# Patient Record
Sex: FEMALE | Race: WHITE | URBAN - METROPOLITAN AREA
[De-identification: names, ages, dates, MRNs, and addresses within clinical notes are randomized per-mention and may not be internally consistent; named-entity substitution may affect disease eponyms.]

---

## 2022-12-27 ENCOUNTER — OFFICE VISIT (OUTPATIENT)
Dept: URBAN - METROPOLITAN AREA CLINIC 17 | Facility: CLINIC | Age: 57
End: 2022-12-27
Payer: COMMERCIAL

## 2022-12-27 DIAGNOSIS — H46.8 OTHER OPTIC NEURITIS: Primary | ICD-10-CM

## 2022-12-27 DIAGNOSIS — H04.123 DRY EYE SYNDROME OF BILATERAL LACRIMAL GLANDS: ICD-10-CM

## 2022-12-27 DIAGNOSIS — H25.813 COMBINED FORMS OF AGE-RELATED CATARACT, BILATERAL: ICD-10-CM

## 2022-12-27 PROCEDURE — 92004 COMPRE OPH EXAM NEW PT 1/>: CPT | Performed by: OPTOMETRIST

## 2022-12-27 ASSESSMENT — INTRAOCULAR PRESSURE
OD: 21
OS: 15

## 2022-12-27 NOTE — IMPRESSION/PLAN
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 Plan: Discussed diagnosis in detail with patient. Discussed risks and benefits and patient understands. Advised patient of condition. Recommend pt to go to PCP or Neurologist and get a MRI and get tested for MS.

## 2023-01-20 ENCOUNTER — OFFICE VISIT (OUTPATIENT)
Dept: URBAN - METROPOLITAN AREA CLINIC 30 | Facility: CLINIC | Age: 58
End: 2023-01-20
Payer: COMMERCIAL

## 2023-01-20 DIAGNOSIS — H04.123 DRY EYE SYNDROME OF BILATERAL LACRIMAL GLANDS: ICD-10-CM

## 2023-01-20 DIAGNOSIS — H43.393 OTHER VITREOUS OPACITIES, BILATERAL: ICD-10-CM

## 2023-01-20 DIAGNOSIS — H47.092 OTHER DISORDERS OF OPTIC NERVE, NOT ELSEWHERE CLASSIFIED, LEFT EYE: ICD-10-CM

## 2023-01-20 DIAGNOSIS — H47.091 OTHER DISORDERS OF OPTIC NERVE, NOT ELSEWHERE CLASSIFIED, RIGHT EYE: ICD-10-CM

## 2023-01-20 DIAGNOSIS — H46.8 OTHER OPTIC NEURITIS: ICD-10-CM

## 2023-01-20 DIAGNOSIS — E11.9 DIABETES MELLITUS TYPE 2 WITHOUT MENTION OF COMPLICATION: Primary | ICD-10-CM

## 2023-01-20 DIAGNOSIS — H25.813 COMBINED FORMS OF AGE-RELATED CATARACT, BILATERAL: ICD-10-CM

## 2023-01-20 DIAGNOSIS — H47.20 OPTIC ATROPHY: ICD-10-CM

## 2023-01-20 DIAGNOSIS — Z79.84 LONG TERM (CURRENT) USE OF ORAL ANTIDIABETIC DRUGS: ICD-10-CM

## 2023-01-20 PROCEDURE — 99204 OFFICE O/P NEW MOD 45 MIN: CPT | Performed by: OPTOMETRIST

## 2023-01-20 PROCEDURE — 92133 CPTRZD OPH DX IMG PST SGM ON: CPT | Performed by: OPTOMETRIST

## 2023-01-20 ASSESSMENT — KERATOMETRY
OD: 47.13
OS: 47.00

## 2023-01-20 ASSESSMENT — VISUAL ACUITY
OD: 20/25
OS: 20/30

## 2023-01-20 ASSESSMENT — INTRAOCULAR PRESSURE
OD: 25
OS: 25

## 2023-01-20 NOTE — IMPRESSION/PLAN
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 Plan: Onset of symptoms 12/2022. MRI showed ; however, ON's appeared WNL. No ONH swelling. Fundus exam shows mild elevated disc OD. Confirmed also on RNFL OCT. Recommend  c NeuroOpthamologist to review MRI results. Pt notes  episodes of dizziness and higher stress levels. Pt was on a recent steroid pack but NI. Pt states hx of B-12 deficiency c previous daily injxns x 17 years ago for a similar presentation/episode in OS. Hx of central scotoma OS x 17 years ago.  

1/2023 RNFL OD) sup: 141, inf: 133 OS) sup: 45, inf: 48 GCA) 61,51

## 2023-01-20 NOTE — IMPRESSION/PLAN
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 Plan: Appears to have some atrophy on sup/temp area. Also noted on RNFL and GCA. pt does have disc at risk OU. Also consider ddx of AION. Pt notes hx of B-12 deficiency c previous daily injxns x 17 years ago when seen by specialist at [facility]. Still notes central scotoma/line in her vision.

## 2023-01-20 NOTE — IMPRESSION/PLAN
Impression: Dry eye syndrome of bilateral lacrimal glands: H04.123. Plan: AT's qid OU. Recommend O3's. Avoid ceiling fans.

## 2023-01-20 NOTE — IMPRESSION/PLAN
Impression: Diabetes mellitus Type 2 without mention of complication: I10.1. Plan: No diabetic retinopathy OU. No Diabetic Macular Edema noted OU. Recommend yearly diabetic eye exam. Emphasized importance of strict BS control, diet, exercise, and BP control to avoid risk of loss of vision. Recommend regular, ongoing follow-ups with their PCP to monitor DM as well. Most recent A1C 6.8, per pt.